# Patient Record
Sex: FEMALE | Race: WHITE | URBAN - METROPOLITAN AREA
[De-identification: names, ages, dates, MRNs, and addresses within clinical notes are randomized per-mention and may not be internally consistent; named-entity substitution may affect disease eponyms.]

---

## 2020-12-24 ENCOUNTER — TRANSFERRED RECORDS (OUTPATIENT)
Dept: HEALTH INFORMATION MANAGEMENT | Facility: CLINIC | Age: 30
End: 2020-12-24

## 2020-12-31 ENCOUNTER — MEDICAL CORRESPONDENCE (OUTPATIENT)
Dept: HEALTH INFORMATION MANAGEMENT | Facility: CLINIC | Age: 30
End: 2020-12-31

## 2021-01-04 ENCOUNTER — TRANSCRIBE ORDERS (OUTPATIENT)
Dept: OTHER | Age: 31
End: 2021-01-04

## 2021-01-04 DIAGNOSIS — R00.2 HEART PALPITATIONS: ICD-10-CM

## 2021-01-04 DIAGNOSIS — R00.0 TACHYCARDIA: Primary | ICD-10-CM

## 2021-01-04 DIAGNOSIS — R06.09 DOE (DYSPNEA ON EXERTION): ICD-10-CM

## 2021-01-07 DIAGNOSIS — R00.0 TACHYCARDIA: ICD-10-CM

## 2021-01-07 DIAGNOSIS — R06.09 DOE (DYSPNEA ON EXERTION): Primary | ICD-10-CM

## 2021-01-07 DIAGNOSIS — R01.1 HEART MURMUR: ICD-10-CM

## 2021-01-08 ENCOUNTER — TELEPHONE (OUTPATIENT)
Dept: CARDIOLOGY | Facility: CLINIC | Age: 31
End: 2021-01-08

## 2021-01-08 NOTE — TELEPHONE ENCOUNTER
Left message for pt stating we have a hold at 2 pm for a new patient consult with Dr. Hollins on 2/8/2021.     Received a referral to schedule with Dr. Hollins for tachycardia.    Carmina Banegas CMA......January 8, 2021     8:49 AM

## 2021-01-12 ENCOUNTER — HOSPITAL ENCOUNTER (OUTPATIENT)
Dept: CARDIOLOGY | Facility: CLINIC | Age: 31
Discharge: HOME | End: 2021-01-12
Attending: FAMILY MEDICINE | Admitting: FAMILY MEDICINE
Payer: OTHER GOVERNMENT

## 2021-01-12 DIAGNOSIS — R01.1 HEART MURMUR: ICD-10-CM

## 2021-01-12 DIAGNOSIS — R06.09 DOE (DYSPNEA ON EXERTION): ICD-10-CM

## 2021-01-12 DIAGNOSIS — R00.0 TACHYCARDIA: ICD-10-CM

## 2021-01-12 PROCEDURE — 93246 EXT ECG>7D<15D RECORDING: CPT

## 2021-01-12 PROCEDURE — 93248 EXT ECG>7D<15D REV&INTERPJ: CPT | Performed by: INTERNAL MEDICINE

## 2021-02-04 ENCOUNTER — OFFICE VISIT (OUTPATIENT)
Dept: CARDIOLOGY | Facility: CLINIC | Age: 31
End: 2021-02-04
Payer: OTHER GOVERNMENT

## 2021-02-04 VITALS — OXYGEN SATURATION: 100 % | WEIGHT: 143 LBS

## 2021-02-04 DIAGNOSIS — R06.09 DOE (DYSPNEA ON EXERTION): Primary | ICD-10-CM

## 2021-02-04 DIAGNOSIS — G90.A POSTURAL ORTHOSTATIC TACHYCARDIA SYNDROME: ICD-10-CM

## 2021-02-04 PROCEDURE — 99204 OFFICE O/P NEW MOD 45 MIN: CPT | Performed by: INTERNAL MEDICINE

## 2021-02-04 PROCEDURE — 93000 ELECTROCARDIOGRAM COMPLETE: CPT | Performed by: INTERNAL MEDICINE

## 2021-02-04 NOTE — LETTER
2/4/2021      Gabby Salinas MD  [facility] [address] [facility] [address]  [facility] [address]      RE: Charlotte Pretty       Dear Colleague,    I had the pleasure of seeing Charlotte Pretty in the [facility].    Service Date: 02/04/2021      REASON FOR CONSULTATION:  Evaluation for shortness of breath and tachycardia.      HISTORY OF PRESENT ILLNESS:  I had the pleasure of seeing Charlotte Pretty at the [facility] in [place] this morning.  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      Seven years ago, she delivered a healthy baby.  She subsequently suffered a hemorrhage in the postpartum period.  After that, she developed shortness of breath.  Symptoms have been ongoing since then.  The shortness of breath is brought on by exertion, such as walking or climbing stairs.  More recently she has been noticing a host of other symptoms including tachycardia, extremity tingling, headaches and dizziness.  She has had extensive noncardiac workup including MRI of the brain which showed no pathology.  She had event monitor, which showed no evidence of significant arrhythmia.  She had rare APCs and PVCs.  Her maximum heart rate during event monitor was 151 beats per minute.  Her minimum heart rate was 57.  Her average heart rate was 89 beats per minute.      She does not report prior history of syncope.  She denies chest pain.  There is no family history of premature coronary artery disease.      Electrocardiogram in the office today shows sinus rhythm with a ventricular rate of 97 beats per minute.  There is no abnormality noted on the EKG.  Her heart rate arrest was 98 beats per minute.  When we stood her up, her heart rate went up to 114 beats per minute.      REVIEW OF SYSTEMS:  Comprehensive review of systems was performed and essentially negative except as mentioned in  the HPI.      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  More recently she has been noticing tachycardia as well, which appears to be positional in nature.  She has had other symptoms including headaches and extremity tingling.  The cause of these symptoms is unclear.  She has read about her symptoms and is concerned about postural tachycardia syndrome or POTS.  She is also concern of possible significant structural cardiac abnormalities.      Since her symptoms started after giving birth, I want to make sure that she has not developed postpartum cardiomyopathy.  We will obtain a transthoracic echocardiogram, although my suspicion for significant cardiomyopathy is low.  After the echocardiogram, we will then send her for a tilt-table study to see if she has POTS.      In the interim, I stressed the importance of hydration and liberalization of salt intake.  I have also asked her to wear compression stockings.      Of note, there might be a component of anxiety or symptoms.  Once we complete her cardiac workup, we will defer her primary doctor to addressing her potential anxiety.      I appreciate the opportunity to participate in her care.      cc:      Gabby Salinas MD   [address]   [place] [address]         DAX AMARO MD             D: 2021   T: 2021   MT:       Name:     NICOLE DORANTES   MRN:      [id number]        Account:      [id number]   :      1990           Service Date: 2021      Document: [id number]          No outpatient encounter medications on file as of 2021.     No facility-administered encounter medications on file as of 2021.        Again, thank you for allowing me to participate in the care of your patient.      Sincerely,    Dax Amaro MD, MD     [facility]

## 2021-02-04 NOTE — PROGRESS NOTES
Service Date: 02/04/2021      REASON FOR CONSULTATION:  Evaluation for shortness of breath and tachycardia.      HISTORY OF PRESENT ILLNESS:  I had the pleasure of seeing Charlotte Pretty at the [facility] in [place] this morning.  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      Seven years ago, she delivered a healthy baby.  She subsequently suffered a hemorrhage in the postpartum period.  After that, she developed shortness of breath.  Symptoms have been ongoing since then.  The shortness of breath is brought on by exertion, such as walking or climbing stairs.  More recently she has been noticing a host of other symptoms including tachycardia, extremity tingling, headaches and dizziness.  She has had extensive noncardiac workup including MRI of the brain which showed no pathology.  She had event monitor, which showed no evidence of significant arrhythmia.  She had rare APCs and PVCs.  Her maximum heart rate during event monitor was 151 beats per minute.  Her minimum heart rate was 57.  Her average heart rate was 89 beats per minute.      She does not report prior history of syncope.  She denies chest pain.  There is no family history of premature coronary artery disease.      Electrocardiogram in the office today shows sinus rhythm with a ventricular rate of 97 beats per minute.  There is no abnormality noted on the EKG.  Her heart rate arrest was 98 beats per minute.  When we stood her up, her heart rate went up to 114 beats per minute.      REVIEW OF SYSTEMS:  Comprehensive review of systems was performed and essentially negative except as mentioned in the HPI.      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  More recently she has been noticing tachycardia as well, which appears to be positional in nature.  She has had other symptoms  including headaches and extremity tingling.  The cause of these symptoms is unclear.  She has read about her symptoms and is concerned about postural tachycardia syndrome or POTS.  She is also concern of possible significant structural cardiac abnormalities.      Since her symptoms started after giving birth, I want to make sure that she has not developed postpartum cardiomyopathy.  We will obtain a transthoracic echocardiogram, although my suspicion for significant cardiomyopathy is low.  After the echocardiogram, we will then send her for a tilt-table study to see if she has POTS.      In the interim, I stressed the importance of hydration and liberalization of salt intake.  I have also asked her to wear compression stockings.      Of note, there might be a component of anxiety or symptoms.  Once we complete her cardiac workup, we will defer her primary doctor to addressing her potential anxiety.      I appreciate the opportunity to participate in her care.      cc:      Gabby Salinas MD   [address]   [place] [address]         DAX AMARO MD             D: 2021   T: 2021   MT: TRINIDAD      Name:     NICOLE DORANTES   MRN:      [id number]        Account:      [id number]   :      1990           Service Date: 2021      Document: [id number]

## 2021-02-04 NOTE — PROGRESS NOTES
HPI and Plan:   See dictation    Orders Placed This Encounter   Procedures     EKG 12-lead complete w/read - Clinics (performed today)     Echocardiogram Complete       No orders of the defined types were placed in this encounter.      There are no discontinued medications.      Encounter Diagnoses   Name Primary?     RODRIGUEZ (dyspnea on exertion) Yes     Postural orthostatic tachycardia syndrome        CURRENT MEDICATIONS:  No current outpatient medications on file.       ALLERGIES   Not on File    PAST MEDICAL HISTORY:  Past Medical History:   Diagnosis Date     ADHD (attention deficit hyperactivity disorder)      RODRIGUEZ (dyspnea on exertion)      Heart murmur      History of augmentation of both breasts 2016     Shift work sleep disorder      Sinus tachycardia        PAST SURGICAL HISTORY:  History reviewed. No pertinent surgical history.    FAMILY HISTORY:  History reviewed. No pertinent family history.    SOCIAL HISTORY:  Social History     Socioeconomic History     Marital status:      Spouse name: None     Number of children: None     Years of education: None     Highest education level: None   Occupational History     None   Social Needs     Financial resource strain: None     Food insecurity     Worry: None     Inability: None     Transportation needs     Medical: None     Non-medical: None   Tobacco Use     Smoking status: None   Substance and Sexual Activity     Alcohol use: None     Drug use: None     Sexual activity: None   Lifestyle     Physical activity     Days per week: None     Minutes per session: None     Stress: None   Relationships     Social connections     Talks on phone: None     Gets together: None     Attends [employer] service: None     Active member of club or organization: None     Attends meetings of clubs or organizations: None     Relationship status: None     Intimate partner violence     Fear of current or ex partner: None     Emotionally abused: None     Physically abused: None      Forced sexual activity: None   Other Topics Concern     None   Social History Narrative     None       Review of Systems:  Skin:  Negative       Eyes:  Negative      ENT:  Negative      Respiratory:  Positive for dyspnea on exertion;shortness of breath progressively worsening   Cardiovascular:  Negative;syncope or near-syncope Positive for;chest pain;palpitations;lightheadedness;dizziness    Gastroenterology: Negative      Genitourinary:  Negative      Musculoskeletal:  Positive for   scoliosis, degenerative disc disease  Neurologic:  Positive for headaches    Psychiatric:  Negative      Heme/Lymph/Imm:  Negative      Endocrine:  Negative        Physical Exam:  Vitals: Wt 64.9 kg (143 lb)   SpO2 100%     Constitutional:  cooperative;in no acute distress        Skin:  warm and dry to the touch          Head:  normocephalic        Eyes:  conjunctivae and lids unremarkable        Lymph:      ENT:  no pallor or cyanosis        Neck:  JVP normal        Respiratory:  clear to auscultation         Cardiac: regular rhythm;no murmurs, gallops or rubs detected                pulses full and equal, no bruits auscultated                                        GI:  abdomen soft;non-tender        Extremities and Muscular Skeletal:  no edema              Neurological:  no gross motor deficits        Psych:  Alert and Oriented x 3        CC  Gabby Salinas MD  [facility]  [address] [address]

## 2021-02-04 NOTE — LETTER
2/4/2021    Gabby Salinas MD  [facility] [address] [facility] [address]  [facility] [address]    RE: Charlotte Pretty       Dear Colleague,    I had the pleasure of seeing Charlotte Pretty in the [facility].    HPI and Plan:   See dictation    Orders Placed This Encounter   Procedures     EKG 12-lead complete w/read - Clinics (performed today)     Echocardiogram Complete       No orders of the defined types were placed in this encounter.      There are no discontinued medications.      Encounter Diagnoses   Name Primary?     RODRIGUEZ (dyspnea on exertion) Yes     Postural orthostatic tachycardia syndrome        CURRENT MEDICATIONS:  No current outpatient medications on file.       ALLERGIES   Not on File    PAST MEDICAL HISTORY:  Past Medical History:   Diagnosis Date     ADHD (attention deficit hyperactivity disorder)      RODRIGUEZ (dyspnea on exertion)      Heart murmur      History of augmentation of both breasts 2016     Shift work sleep disorder      Sinus tachycardia        PAST SURGICAL HISTORY:  History reviewed. No pertinent surgical history.    FAMILY HISTORY:  History reviewed. No pertinent family history.    SOCIAL HISTORY:  Social History     Socioeconomic History     Marital status:      Spouse name: None     Number of children: None     Years of education: None     Highest education level: None   Occupational History     None   Social Needs     Financial resource strain: None     Food insecurity     Worry: None     Inability: None     Transportation needs     Medical: None     Non-medical: None   Tobacco Use     Smoking status: None   Substance and Sexual Activity     Alcohol use: None     Drug use: None     Sexual activity: None   Lifestyle     Physical activity     Days per week: None     Minutes per session: None     Stress: None   Relationships     Social connections     Talks on phone: None     Gets together: None     Attends Je[employer] witness service:  None     Active member of club or organization: None     Attends meetings of clubs or organizations: None     Relationship status: None     Intimate partner violence     Fear of current or ex partner: None     Emotionally abused: None     Physically abused: None     Forced sexual activity: None   Other Topics Concern     None   Social History Narrative     None       Review of Systems:  Skin:  Negative       Eyes:  Negative      ENT:  Negative      Respiratory:  Positive for dyspnea on exertion;shortness of breath progressively worsening   Cardiovascular:  Negative;syncope or near-syncope Positive for;chest pain;palpitations;lightheadedness;dizziness    Gastroenterology: Negative      Genitourinary:  Negative      Musculoskeletal:  Positive for   scoliosis, degenerative disc disease  Neurologic:  Positive for headaches    Psychiatric:  Negative      Heme/Lymph/Imm:  Negative      Endocrine:  Negative        Physical Exam:  Vitals: Wt 64.9 kg (143 lb)   SpO2 100%     Constitutional:  cooperative;in no acute distress        Skin:  warm and dry to the touch          Head:  normocephalic        Eyes:  conjunctivae and lids unremarkable        Lymph:      ENT:  no pallor or cyanosis        Neck:  JVP normal        Respiratory:  clear to auscultation         Cardiac: regular rhythm;no murmurs, gallops or rubs detected                pulses full and equal, no bruits auscultated                                        GI:  abdomen soft;non-tender        Extremities and Muscular Skeletal:  no edema              Neurological:  no gross motor deficits        Psych:  Alert and Oriented x 3        CC  [place] [address]                  Thank you for allowing me to participate in the care of your patient.      Sincerely,     Saad Rosales MD, MD     [facility]  cc:   Gabby Salinas MD  [facility]  HEALTH  [address] [facility] [address]  [place] [address]

## 2021-02-09 ENCOUNTER — HOSPITAL ENCOUNTER (OUTPATIENT)
Dept: CARDIOLOGY | Facility: CLINIC | Age: 31
Discharge: HOME | End: 2021-02-09
Attending: INTERNAL MEDICINE | Admitting: INTERNAL MEDICINE
Payer: OTHER GOVERNMENT

## 2021-02-09 DIAGNOSIS — R06.09 DOE (DYSPNEA ON EXERTION): ICD-10-CM

## 2021-02-09 PROCEDURE — 93306 TTE W/DOPPLER COMPLETE: CPT

## 2021-02-09 PROCEDURE — 93306 TTE W/DOPPLER COMPLETE: CPT | Mod: 26 | Performed by: INTERNAL MEDICINE

## 2021-02-15 DIAGNOSIS — Z11.59 ENCOUNTER FOR SCREENING FOR OTHER VIRAL DISEASES: ICD-10-CM

## 2021-02-27 DIAGNOSIS — Z11.59 ENCOUNTER FOR SCREENING FOR OTHER VIRAL DISEASES: ICD-10-CM

## 2021-02-27 LAB
SARS-COV-2 RNA RESP QL NAA+PROBE: NORMAL
SPECIMEN SOURCE: NORMAL

## 2021-02-27 PROCEDURE — U0003 INFECTIOUS AGENT DETECTION BY NUCLEIC ACID (DNA OR RNA); SEVERE ACUTE RESPIRATORY SYNDROME CORONAVIRUS 2 (SARS-COV-2) (CORONAVIRUS DISEASE [COVID-19]), AMPLIFIED PROBE TECHNIQUE, MAKING USE OF HIGH THROUGHPUT TECHNOLOGIES AS DESCRIBED BY CMS-2020-01-R: HCPCS | Performed by: INTERNAL MEDICINE

## 2021-02-27 PROCEDURE — U0005 INFEC AGEN DETEC AMPLI PROBE: HCPCS | Performed by: INTERNAL MEDICINE

## 2021-02-28 LAB
LABORATORY COMMENT REPORT: NORMAL
SARS-COV-2 RNA RESP QL NAA+PROBE: NEGATIVE
SPECIMEN SOURCE: NORMAL

## 2021-03-02 ENCOUNTER — HOSPITAL ENCOUNTER (OUTPATIENT)
Facility: CLINIC | Age: 31
Discharge: HOME | End: 2021-03-02
Admitting: INTERNAL MEDICINE
Payer: OTHER GOVERNMENT

## 2021-03-02 VITALS
HEART RATE: 87 BPM | SYSTOLIC BLOOD PRESSURE: 124 MMHG | TEMPERATURE: 97.8 F | HEIGHT: 66 IN | BODY MASS INDEX: 22.55 KG/M2 | WEIGHT: 140.3 LBS | RESPIRATION RATE: 16 BRPM | OXYGEN SATURATION: 98 % | DIASTOLIC BLOOD PRESSURE: 83 MMHG

## 2021-03-02 DIAGNOSIS — G90.A POSTURAL ORTHOSTATIC TACHYCARDIA SYNDROME: ICD-10-CM

## 2021-03-02 DIAGNOSIS — G90.A POSTURAL ORTHOSTATIC TACHYCARDIA SYNDROME: Primary | ICD-10-CM

## 2021-03-02 DIAGNOSIS — R06.09 DOE (DYSPNEA ON EXERTION): ICD-10-CM

## 2021-03-02 DIAGNOSIS — R00.0 TACHYCARDIA: ICD-10-CM

## 2021-03-02 LAB
ANION GAP SERPL CALCULATED.3IONS-SCNC: 6 MMOL/L (ref 3–14)
APTT PPP: 28 SEC (ref 22–37)
BUN SERPL-MCNC: 16 MG/DL (ref 7–30)
CALCIUM SERPL-MCNC: 9.1 MG/DL (ref 8.5–10.1)
CHLORIDE SERPL-SCNC: 108 MMOL/L (ref 94–109)
CO2 SERPL-SCNC: 26 MMOL/L (ref 20–32)
CREAT SERPL-MCNC: 0.7 MG/DL (ref 0.52–1.04)
ERYTHROCYTE [DISTWIDTH] IN BLOOD BY AUTOMATED COUNT: 11.6 % (ref 10–15)
GFR SERPL CREATININE-BSD FRML MDRD: >90 ML/MIN/{1.73_M2}
GLUCOSE SERPL-MCNC: 87 MG/DL (ref 70–99)
HCT VFR BLD AUTO: 41.5 % (ref 35–47)
HGB BLD-MCNC: 13.9 G/DL (ref 11.7–15.7)
INR PPP: 1.04 (ref 0.86–1.14)
MCH RBC QN AUTO: 32.6 PG (ref 26.5–33)
MCHC RBC AUTO-ENTMCNC: 33.5 G/DL (ref 31.5–36.5)
MCV RBC AUTO: 97 FL (ref 78–100)
PLATELET # BLD AUTO: 295 10E9/L (ref 150–450)
POTASSIUM SERPL-SCNC: 3.7 MMOL/L (ref 3.4–5.3)
RBC # BLD AUTO: 4.27 10E12/L (ref 3.8–5.2)
SODIUM SERPL-SCNC: 140 MMOL/L (ref 133–144)
WBC # BLD AUTO: 5.7 10E9/L (ref 4–11)

## 2021-03-02 PROCEDURE — 85610 PROTHROMBIN TIME: CPT

## 2021-03-02 PROCEDURE — 93010 ELECTROCARDIOGRAM REPORT: CPT | Performed by: INTERNAL MEDICINE

## 2021-03-02 PROCEDURE — 80048 BASIC METABOLIC PNL TOTAL CA: CPT

## 2021-03-02 PROCEDURE — 85730 THROMBOPLASTIN TIME PARTIAL: CPT

## 2021-03-02 PROCEDURE — 93660 TILT TABLE EVALUATION: CPT | Performed by: INTERNAL MEDICINE

## 2021-03-02 PROCEDURE — 93005 ELECTROCARDIOGRAM TRACING: CPT

## 2021-03-02 PROCEDURE — 85027 COMPLETE CBC AUTOMATED: CPT

## 2021-03-02 PROCEDURE — 999N000054 HC STATISTIC EKG NON-CHARGEABLE

## 2021-03-02 PROCEDURE — 258N000003 HC RX IP 258 OP 636: Performed by: INTERNAL MEDICINE

## 2021-03-02 PROCEDURE — 999N000071 HC STATISTIC HEART CATH LAB OR EP LAB

## 2021-03-02 RX ORDER — SODIUM CHLORIDE 9 MG/ML
INJECTION, SOLUTION INTRAVENOUS CONTINUOUS
Status: DISCONTINUED | OUTPATIENT
Start: 2021-03-02 | End: 2021-03-02 | Stop reason: HOSPADM

## 2021-03-02 RX ORDER — LIDOCAINE 40 MG/G
CREAM TOPICAL
Status: DISCONTINUED | OUTPATIENT
Start: 2021-03-02 | End: 2021-03-02 | Stop reason: HOSPADM

## 2021-03-02 RX ORDER — LIDOCAINE 40 MG/G
CREAM TOPICAL
Status: CANCELLED | OUTPATIENT
Start: 2021-03-02

## 2021-03-02 RX ORDER — SODIUM CHLORIDE 9 MG/ML
INJECTION, SOLUTION INTRAVENOUS CONTINUOUS
Status: CANCELLED | OUTPATIENT
Start: 2021-03-02

## 2021-03-02 RX ADMIN — SODIUM CHLORIDE: 9 INJECTION, SOLUTION INTRAVENOUS at 09:42

## 2021-03-02 ASSESSMENT — MIFFLIN-ST. JEOR: SCORE: 1363.15

## 2021-03-02 NOTE — PRE-PROCEDURE
GENERAL PRE-PROCEDURE:   Procedure:  Tilt table    Written consent obtained?: Yes    Risks and benefits: Risks, benefits and alternatives were discussed    Consent given by:  Patient  Patient states understanding of procedure being performed: Yes    Patient's understanding of procedure matches consent: Yes    Procedure consent matches procedure scheduled: Yes    Expected level of sedation:  Moderate  Appropriately NPO:  Yes  ASA Class:  Class 1- healthy patient  Mallampati  :  Grade 1- soft palate, uvula, tonsillar pillars, and posterior pharyngeal wall visible  Lungs:  Lungs clear with good breath sounds bilaterally  Heart:  Normal heart sounds and rate  History & Physical reviewed:  History and physical reviewed and no updates needed  Statement of review:  I have reviewed the lab findings, diagnostic data, medications, and the plan for sedation

## 2021-03-02 NOTE — PROGRESS NOTES
PATIENT/VISITOR WELLNESS SCREENING    Step 1 Patient Screening    1. In the last month, have you been in contact with someone who was confirmed or suspected to have Coronavirus/COVID-19? Yes: Pt works in [employer]. Had an exposure over a week ago. Has had both vaccines and her COVID test after the exposure that is negative.    2. Do you have the following symptoms?  Fever/Chills? No   Cough? No   Shortness of breath? No   New loss of taste or smell? No  Sore throat? No  Muscle or body aches? No  Headaches? No  Fatigue? No  Vomiting or diarrhea? No    Step 2 Visitor Screening    1. Name of Visitor (1 visitor per patient): Zack    2. In the last month, have you been in contact with someone who was confirmed or suspected to have Coronavirus/COVID-19? No    3. Do you have the following symptoms?  Fever/Chills? No   Cough? No   Shortness of breath? No   Skin rash? No   Loss of taste or smell? No  Sore throat? No  Runny or stuffy nose? No  Muscle or body aches? No  Headaches? No  Fatigue? No  Vomiting or diarrhea? No    If the visitor has positive symptoms, notify supervisor/manger  Per policy, the visitor will need to leave the facility     Step 3 Refer to logic grid below for actions    NO SYMPTOM(S)    ACTIONS:  1. Standard rooming process  2. Provider to assess per normal protocol  3. Implement precautions as needed and per guidelines     POSITIVE SYMPTOM(S)  If positive for ANY of the following symptoms: fever, cough, shortness of breath, rash    ACTION:  1. Continue to have the patient wear a mask   2. Room patient as soon as possible  3. Don appropriate PPE when entering room  4. Provider evaluation    Care Suites Admission Nursing Note    Patient Information  Name: Charlotte Pretty  Age: 30 year old  Reason for admission: Tilt table  Care Suites arrival time: 0825    Visitor Information  Name: Zack  Informed of visitor restrictions: Yes  1 visitor allowed per patient   Visitor must screen negative for COVID  symptoms   Visitor must wear a mask  Waiting rooms closed to visitors    Patient Admission/Assessment   Pre-procedure assessment complete: Yes  If abnormal assessment/labs, provider notified: N/A  NPO: Yes  Medications held per instructions/orders: N/A  Consent: deferred  If applicable, pregnancy test status: Pt denies, has had uterine ablation and is on injectable birth control,  has had surgical sterilization.   Patient oriented to room: Yes  Education/questions answered: Yes  Plan/other: Consent obtained, some labs still pending, Cath Lab ready for pt.    Discharge Planning  Discharge name/phone number:   Overnight post sedation caregiver: Alexandre is waiting for pt.  Discharge location: home    Janki Goodwin RN

## 2021-03-02 NOTE — PROGRESS NOTES
1107 Report received from Janki Goodwin RN.  1115 Pt A/O. Denies pain discomfort or dizziness at this time.  1125 Discharge instructions given to pt & . All questions & concerns addressed.  1130 Pt discharged per ambulatory. Accompanied by . All personal belongings taken with pt.

## 2021-03-02 NOTE — PROGRESS NOTES
Dictated.    Intermittent tachycardia in the high 120s during upright posture.  Heart rate change of at least 40 bpm compared to supine baseline.    These results are suggestive of an autonomic disturbance in the POTS family.    Plan:  -I will relay the results of this test to Dr. Rosales who is her primary cardiologist.  -Regular aerobic exercise, excellent hydration and increasing salt intake are recommended.  There is a host of pharmacologic therapies that may be helpful in some individuals with POTS.

## 2021-03-02 NOTE — DISCHARGE INSTRUCTIONS
Regular aerobic exercise, excellent hydration and increasing salt intake are recommended.      Follow up with Dr Rosales as directed.

## 2021-03-03 NOTE — RESULT ENCOUNTER NOTE
Tilt table results were reviewed by Dr. Rosales. Patient does have mild case of POTS, recommend regular aerobic exercise, excellent hydration and increasing salt in diet. He may introduce Toprol for HR control. Okay to schedule video visit to review.    I telephoned patient to review these results with her. She states she would like to see how she does in the next few weeks with resuming her regular exercise (which she stopped until she figured out her tachycardia) and working on water/salt intake. She and I will talk in a couple weeks regarding her symptoms. She has my direct contact information.    Priti MATHEWS  [facility]

## 2021-03-07 ENCOUNTER — HEALTH MAINTENANCE LETTER (OUTPATIENT)
Age: 31
End: 2021-03-07

## 2021-03-09 LAB — INTERPRETATION ECG - MUSE: NORMAL

## 2021-03-17 ENCOUNTER — TELEPHONE (OUTPATIENT)
Dept: CARDIOLOGY | Facility: CLINIC | Age: 31
End: 2021-03-17

## 2021-03-17 DIAGNOSIS — G90.A POSTURAL ORTHOSTATIC TACHYCARDIA SYNDROME: Primary | ICD-10-CM

## 2021-03-17 DIAGNOSIS — R00.0 TACHYCARDIA: ICD-10-CM

## 2021-03-17 RX ORDER — METOPROLOL SUCCINATE 25 MG/1
12.5 TABLET, EXTENDED RELEASE ORAL DAILY
Qty: 45 TABLET | Refills: 3 | Status: SHIPPED | OUTPATIENT
Start: 2021-03-17

## 2021-03-17 NOTE — TELEPHONE ENCOUNTER
Returned call from patient who reported she is feeling less fatigued by resuming her exercise, increasing water and salt intake. However, her heart rate is variable, yesterday noting  bpm. She would like to start metoprolol to help control heart rate.    Per Dr. Rosales, start Charlotte on 12.5mg Toprol XL daily. I telephoned patient to share this information. Education on beta blockers provided. Patient works night shift, will likely take medication in the morning after work. Follow-up appt scheduled 5/4/21-date, time and location reviewed with patient. She has my direct contact information, instructed to call with any questions, concerns, or should she experience lightheadness, dizziness with new medication. She will continue to monitor her heart rate.    Priti Newman RN  [facility]

## 2021-05-04 ENCOUNTER — OFFICE VISIT (OUTPATIENT)
Dept: CARDIOLOGY | Facility: CLINIC | Age: 31
End: 2021-05-04
Payer: OTHER GOVERNMENT

## 2021-05-04 VITALS
OXYGEN SATURATION: 100 % | DIASTOLIC BLOOD PRESSURE: 81 MMHG | SYSTOLIC BLOOD PRESSURE: 132 MMHG | HEART RATE: 79 BPM | BODY MASS INDEX: 21.31 KG/M2 | WEIGHT: 132 LBS

## 2021-05-04 DIAGNOSIS — G90.A POTS (POSTURAL ORTHOSTATIC TACHYCARDIA SYNDROME): Primary | ICD-10-CM

## 2021-05-04 PROCEDURE — 99207 PR NO DOCUMENTATION ON VISIT: CPT | Performed by: INTERNAL MEDICINE

## 2021-05-04 SDOH — HEALTH STABILITY: MENTAL HEALTH: HOW OFTEN DO YOU HAVE A DRINK CONTAINING ALCOHOL?: 2-4 TIMES A MONTH

## 2021-05-04 SDOH — HEALTH STABILITY: MENTAL HEALTH: HOW MANY STANDARD DRINKS CONTAINING ALCOHOL DO YOU HAVE ON A TYPICAL DAY?: NOT ASKED

## 2021-05-04 SDOH — HEALTH STABILITY: MENTAL HEALTH: HOW OFTEN DO YOU HAVE 6 OR MORE DRINKS ON ONE OCCASION?: NOT ASKED

## 2021-05-04 NOTE — LETTER
5/4/2021    Gabby Salinas MD  [facility] [address] [facility] [address]  [facility] [address]    RE: Charlotte Pretty       Dear Colleague,    I had the pleasure of seeing Charlotte Pretty in the [facility].    HPI and Plan:   See dictation    No orders of the defined types were placed in this encounter.      No orders of the defined types were placed in this encounter.      There are no discontinued medications.      Encounter Diagnosis   Name Primary?     POTS (postural orthostatic tachycardia syndrome) Yes       CURRENT MEDICATIONS:  Current Outpatient Medications   Medication Sig Dispense Refill     metoprolol succinate ER (TOPROL XL) 25 MG 24 hr tablet Take 0.5 tablets (12.5 mg) by mouth daily (Patient taking differently: Take 25 mg by mouth daily ) 45 tablet 3       ALLERGIES   No Known Allergies    PAST MEDICAL HISTORY:  Past Medical History:   Diagnosis Date     ADHD (attention deficit hyperactivity disorder)      RODRIGUEZ (dyspnea on exertion)      Heart murmur      History of augmentation of both breasts 2016     Shift work sleep disorder      Sinus tachycardia        PAST SURGICAL HISTORY:  Past Surgical History:   Procedure Laterality Date     EP STUDY TILT TABLE N/A 3/2/2021    Procedure: EP Tilt Table;  Surgeon: Evaristo Rosa MD;  Location:  [facility]       FAMILY HISTORY:  History reviewed. No pertinent family history.    SOCIAL HISTORY:  Social History     Socioeconomic History     Marital status:      Spouse name: None     Number of children: None     Years of education: None     Highest education level: None   Occupational History     None   Social Needs     Financial resource strain: None     Food insecurity     Worry: None     Inability: None     Transportation needs     Medical: None     Non-medical: None   Tobacco Use     Smoking status: Never Smoker     Smokeless tobacco: Never Used   Substance and Sexual  Activity     Alcohol use: Yes     Frequency: 2-4 times a month     Drug use: None     Sexual activity: None   Lifestyle     Physical activity     Days per week: None     Minutes per session: None     Stress: None   Relationships     Social connections     Talks on phone: None     Gets together: None     Attends Gnosticism service: None     Active member of club or organization: None     Attends meetings of clubs or organizations: None     Relationship status: None     Intimate partner violence     Fear of current or ex partner: None     Emotionally abused: None     Physically abused: None     Forced sexual activity: None   Other Topics Concern     None   Social History Narrative     None       Review of Systems:  Skin:  Negative       Eyes:  Negative      ENT:  Negative      Respiratory:  Positive for shortness of breath gotten better   Cardiovascular:    Positive for;palpitations    Gastroenterology: Negative      Genitourinary:  not assessed      Musculoskeletal:  Positive for   scoliosis, degenerative disc disease  Neurologic:  Negative      Psychiatric:  Negative      Heme/Lymph/Imm:  Negative      Endocrine:  Negative        Physical Exam:  Vitals: /81   Pulse 79   Wt 59.9 kg (132 lb)   SpO2 100%   BMI 21.31 kg/m      Constitutional:           Skin:             Head:           Eyes:           Lymph:      ENT:           Neck:           Respiratory:            Cardiac:                                                           GI:           Extremities and Muscular Skeletal:                 Neurological:           Psych:       Thank you for allowing me to participate in the care of your patient.      Sincerely,     Saad Rosales MD, MD     [facility]    cc:   Gabby Salinas MD  [place] [phone]

## 2021-05-04 NOTE — PROGRESS NOTES
HPI and Plan:   See dictation    No orders of the defined types were placed in this encounter.      No orders of the defined types were placed in this encounter.      There are no discontinued medications.      Encounter Diagnosis   Name Primary?     POTS (postural orthostatic tachycardia syndrome) Yes       CURRENT MEDICATIONS:  Current Outpatient Medications   Medication Sig Dispense Refill     metoprolol succinate ER (TOPROL XL) 25 MG 24 hr tablet Take 0.5 tablets (12.5 mg) by mouth daily (Patient taking differently: Take 25 mg by mouth daily ) 45 tablet 3       ALLERGIES   No Known Allergies    PAST MEDICAL HISTORY:  Past Medical History:   Diagnosis Date     ADHD (attention deficit hyperactivity disorder)      RODRIGUEZ (dyspnea on exertion)      Heart murmur      History of augmentation of both breasts 2016     Shift work sleep disorder      Sinus tachycardia        PAST SURGICAL HISTORY:  Past Surgical History:   Procedure Laterality Date     EP STUDY TILT TABLE N/A 3/2/2021    Procedure: EP Tilt Table;  Surgeon: Evaristo Rosa MD;  Location:  [facility]       FAMILY HISTORY:  History reviewed. No pertinent family history.    SOCIAL HISTORY:  Social History     Socioeconomic History     Marital status:      Spouse name: None     Number of children: None     Years of education: None     Highest education level: None   Occupational History     None   Social Needs     Financial resource strain: None     Food insecurity     Worry: None     Inability: None     Transportation needs     Medical: None     Non-medical: None   Tobacco Use     Smoking status: Never Smoker     Smokeless tobacco: Never Used   Substance and Sexual Activity     Alcohol use: Yes     Frequency: 2-4 times a month     Drug use: None     Sexual activity: None   Lifestyle     Physical activity     Days per week: None     Minutes per session: None     Stress: None   Relationships     Social connections     Talks on  phone: None     Gets together: None     Attends Confucianist service: None     Active member of club or organization: None     Attends meetings of clubs or organizations: None     Relationship status: None     Intimate partner violence     Fear of current or ex partner: None     Emotionally abused: None     Physically abused: None     Forced sexual activity: None   Other Topics Concern     None   Social History Narrative     None       Review of Systems:  Skin:  Negative       Eyes:  Negative      ENT:  Negative      Respiratory:  Positive for shortness of breath gotten better   Cardiovascular:    Positive for;palpitations    Gastroenterology: Negative      Genitourinary:  not assessed      Musculoskeletal:  Positive for   scoliosis, degenerative disc disease  Neurologic:  Negative      Psychiatric:  Negative      Heme/Lymph/Imm:  Negative      Endocrine:  Negative        Physical Exam:  Vitals: /81   Pulse 79   Wt 59.9 kg (132 lb)   SpO2 100%   BMI 21.31 kg/m      Constitutional:           Skin:             Head:           Eyes:           Lymph:      ENT:           Neck:           Respiratory:            Cardiac:                                                           GI:           Extremities and Muscular Skeletal:                 Neurological:           Psych:           CC  Gabby Salinas MD  [facility]  [address] [address]

## 2021-10-11 ENCOUNTER — HEALTH MAINTENANCE LETTER (OUTPATIENT)
Age: 31
End: 2021-10-11

## 2022-03-27 ENCOUNTER — HEALTH MAINTENANCE LETTER (OUTPATIENT)
Age: 32
End: 2022-03-27

## 2022-09-24 ENCOUNTER — HEALTH MAINTENANCE LETTER (OUTPATIENT)
Age: 32
End: 2022-09-24

## 2023-05-08 ENCOUNTER — HEALTH MAINTENANCE LETTER (OUTPATIENT)
Age: 33
End: 2023-05-08

## 2024-05-11 ENCOUNTER — HEALTH MAINTENANCE LETTER (OUTPATIENT)
Age: 34
End: 2024-05-11